# Patient Record
Sex: MALE | Race: BLACK OR AFRICAN AMERICAN | NOT HISPANIC OR LATINO | URBAN - METROPOLITAN AREA
[De-identification: names, ages, dates, MRNs, and addresses within clinical notes are randomized per-mention and may not be internally consistent; named-entity substitution may affect disease eponyms.]

---

## 2022-12-17 ENCOUNTER — HOSPITAL ENCOUNTER (EMERGENCY)
Facility: HOSPITAL | Age: 47
Discharge: HOME | End: 2022-12-17
Attending: EMERGENCY MEDICINE
Payer: COMMERCIAL

## 2022-12-17 VITALS
HEART RATE: 64 BPM | WEIGHT: 181 LBS | RESPIRATION RATE: 20 BRPM | SYSTOLIC BLOOD PRESSURE: 145 MMHG | OXYGEN SATURATION: 98 % | TEMPERATURE: 98 F | DIASTOLIC BLOOD PRESSURE: 92 MMHG

## 2022-12-17 DIAGNOSIS — S29.9XXA CHEST WALL INJURY: ICD-10-CM

## 2022-12-17 DIAGNOSIS — I10 PRIMARY HYPERTENSION: ICD-10-CM

## 2022-12-17 DIAGNOSIS — S22.31XA CLOSED FRACTURE OF ONE RIB OF RIGHT SIDE, INITIAL ENCOUNTER: Primary | ICD-10-CM

## 2022-12-17 PROCEDURE — 96372 THER/PROPH/DIAG INJ SC/IM: CPT | Performed by: EMERGENCY MEDICINE

## 2022-12-17 PROCEDURE — 94799 UNLISTED PULMONARY SVC/PX: CPT

## 2022-12-17 PROCEDURE — 99284 EMERGENCY DEPT VISIT MOD MDM: CPT

## 2022-12-17 PROCEDURE — 99900035 HC TECH TIME PER 15 MIN (STAT)

## 2022-12-17 PROCEDURE — 63600175 PHARM REV CODE 636 W HCPCS: Performed by: EMERGENCY MEDICINE

## 2022-12-17 RX ORDER — MORPHINE SULFATE 10 MG/ML
10 INJECTION INTRAMUSCULAR; INTRAVENOUS; SUBCUTANEOUS
Status: COMPLETED | OUTPATIENT
Start: 2022-12-17 | End: 2022-12-17

## 2022-12-17 RX ADMIN — MORPHINE SULFATE 10 MG: 10 INJECTION, SOLUTION INTRAMUSCULAR; INTRAVENOUS at 09:12

## 2022-12-17 NOTE — ED PROVIDER NOTES
SCRIBE #1 NOTE: I, Belem Dunn/Patel Bocanegra, am scribing for, and in the presence of, Alla Glass MD. I have scribed the entire note.      History      Chief Complaint   Patient presents with    Rib Injury     Right rib pain after fall 3 days ago       Review of patient's allergies indicates:  No Known Allergies     HPI   HPI    12/17/2022, 9:20 AM   History obtained from the patient      History of Present Illness: Evin Gallo is a 47 y.o. male patient who presents to the Emergency Department for R-sided CP that onset suddenly after he slipped and fell 3 days ago. Pt states that tripped over a wet rag in a parking lot which caused his fall. He denies LOC. Symptoms are constant and moderate in severity. Pt reports that his pain is worse with deep breaths, movement, and coughing. No associated sxs reported . Patient denies any LOC, SOB, neck pain, back pain, arthralgias, myalgias, headaches, N/V/D, chills, fever, weakness, and all other sxs at this time. No prior Tx reported. No further complaints or concerns at this time.         Arrival mode: Personal vehicle      PCP: Primary Doctor No       Past Medical History:  No past medical history on file.    Past Surgical History:  No past surgical history on file.      Family History:  No family history on file.    Social History:  Social History     Tobacco Use    Smoking status: Not on file    Smokeless tobacco: Not on file   Substance and Sexual Activity    Alcohol use: Not on file    Drug use: Not on file    Sexual activity: Not on file       ROS   Review of Systems   Constitutional:  Negative for chills and fever.   HENT:  Negative for sore throat.    Respiratory:  Negative for shortness of breath.    Cardiovascular:  Positive for chest pain (R-sided).   Gastrointestinal:  Negative for diarrhea, nausea and vomiting.   Genitourinary:  Negative for dysuria.   Musculoskeletal:  Negative for arthralgias, back pain, myalgias and neck pain.   Skin:  Negative for  rash.   Neurological:  Negative for weakness and headaches.        (-) LOC   Hematological:  Does not bruise/bleed easily.   All other systems reviewed and are negative.    Physical Exam      Initial Vitals [12/17/22 0719]   BP Pulse Resp Temp SpO2   (!) 173/114 106 (!) 32 97.9 °F (36.6 °C) 98 %      MAP       --          Physical Exam  Nursing Notes and Vital Signs Reviewed.  Constitutional: Patient is in no acute distress. Well-developed and well-nourished.  Head: Atraumatic. Normocephalic.  Eyes: PERRL. EOM intact. Conjunctivae are not pale. No scleral icterus.  ENT: Mucous membranes are moist. Oropharynx is clear and symmetric.    Neck: Supple. Full ROM. No lymphadenopathy.  Cardiovascular: Regular rate. Regular rhythm. No murmurs, rubs, or gallops. Distal pulses are 2+ and symmetric.  Pulmonary/Chest: There is tenderness to the R anterolateral chest. No respiratory distress. Clear to auscultation bilaterally. No wheezing or rales.   Abdominal: Soft and non-distended.  There is no tenderness.  No rebound, guarding, or rigidity.   Musculoskeletal: Moves all extremities. No obvious deformities. No edema.  Skin: Warm and dry.  Neurological:  Alert, awake, and appropriate.  Normal speech.  No acute focal neurological deficits are appreciated.  Psychiatric: Normal affect. Good eye contact. Appropriate in content.    ED Course    Procedures  ED Vital Signs:  Vitals:    12/17/22 0719 12/17/22 0954 12/17/22 1009   BP: (!) 173/114  (!) 145/92   Pulse: 106  64   Resp: (!) 32 20 20   Temp: 97.9 °F (36.6 °C)     TempSrc: Oral     SpO2: 98%  98%   Weight: 82.1 kg (181 lb)         Abnormal Lab Results:  Labs Reviewed - No data to display       Imaging Results:  Imaging Results              X-Ray Ribs 2 View Right (Final result)  Result time 12/17/22 09:02:46      Final result by Momo Leonardo MD (12/17/22 09:02:46)                   Impression:      See above      Electronically signed by: Momo Leonardo  MD  Date:    12/17/2022  Time:    09:02               Narrative:    EXAMINATION:  XR RIBS 2 VIEW RIGHT    CLINICAL HISTORY:  Unspecified injury of thorax, initial encounter    TECHNIQUE:  Two views of the right ribs were performed.    COMPARISON:  None    FINDINGS:  There is an acute nondisplaced fracture of the lateral aspect of the right 6th rib.  No pneumothorax.                                              The Emergency Provider reviewed the vital signs and test results, which are outlined above.    ED Discussion     9:32 AM: Reassessed pt at this time. Pt reports that he has pain medication at home to take. Discussed with pt all pertinent ED information and results. Discussed pt dx and plan of tx. Gave pt all f/u and return to the ED instructions. All questions and concerns were addressed at this time. Pt expresses understanding of information and instructions, and is comfortable with plan to discharge. Pt is stable for discharge.    I discussed with patient and/or family/caretaker that evaluation in the ED does not suggest any emergent or life threatening medical conditions requiring immediate intervention beyond what was provided in the ED, and I believe patient is safe for discharge.  Regardless, an unremarkable evaluation in the ED does not preclude the development or presence of a serious of life threatening condition. As such, patient was instructed to return immediately for any worsening or change in current symptoms.           ED Medication(s):  Medications   morphine injection 10 mg (10 mg Intramuscular Given 12/17/22 0954)     There are no discharge medications for this patient.         Follow-up Information       Your doctor In 2 days.               [facility] - Emergency Dept..    Specialty: Emergency Medicine  Why: As needed, If symptoms worsen  Contact information:  [id number] [facility] [id number] [phone]                             Medical Decision Making    Medical  Decision Making:   Clinical Tests:   Radiological Study: Ordered and Reviewed         Scribe Attestation:   Scribe #1: I performed the above scribed service and the documentation accurately describes the services I performed. I attest to the accuracy of the note.    Attending:   Physician Attestation Statement for Scribe #1: I, Alla Glass MD, personally performed the services described in this documentation, as scribed by Belem Dunn/Patel Bocanegra, in my presence, and it is both accurate and complete.          Clinical Impression       ICD-10-CM ICD-9-CM   1. Closed fracture of one rib of right side, initial encounter  S22.31XA 807.01   2. Chest wall injury  S29.9XXA 959.11   3. Primary hypertension  I10 401.9       Disposition:   Disposition: Discharged  Condition: Stable       Alla Glass MD  12/18/22 0673